# Patient Record
Sex: FEMALE | Race: ASIAN | Employment: FULL TIME | URBAN - METROPOLITAN AREA
[De-identification: names, ages, dates, MRNs, and addresses within clinical notes are randomized per-mention and may not be internally consistent; named-entity substitution may affect disease eponyms.]

---

## 2019-11-23 PROBLEM — O09.521 ADVANCED MATERNAL AGE IN MULTIGRAVIDA, FIRST TRIMESTER: Status: ACTIVE | Noted: 2019-11-23

## 2019-12-30 PROBLEM — D56.9 MATERNAL THALASSEMIA COMPLICATING PREGNANCY IN FIRST TRIMESTER: Status: ACTIVE | Noted: 2019-12-30

## 2019-12-30 PROBLEM — O34.01 BICORNUATE UTERUS AFFECTING PREGNANCY IN FIRST TRIMESTER, ANTEPARTUM: Status: ACTIVE | Noted: 2019-12-30

## 2019-12-30 PROBLEM — Q51.3 BICORNUATE UTERUS AFFECTING PREGNANCY IN FIRST TRIMESTER, ANTEPARTUM: Status: ACTIVE | Noted: 2019-12-30

## 2019-12-30 PROBLEM — Z36.82 NUCHAL TRANSLUCENCY OF FETUS ON PRENATAL ULTRASOUND: Status: ACTIVE | Noted: 2019-12-30

## 2019-12-30 PROBLEM — O99.011 MATERNAL THALASSEMIA COMPLICATING PREGNANCY IN FIRST TRIMESTER: Status: ACTIVE | Noted: 2019-12-30

## 2019-12-30 PROBLEM — O09.91 HIGH-RISK PREGNANCY, FIRST TRIMESTER: Status: ACTIVE | Noted: 2019-12-30

## 2020-01-28 ENCOUNTER — HOSPITAL ENCOUNTER (OUTPATIENT)
Dept: LAB | Age: 40
Discharge: HOME | End: 2020-01-28
Payer: COMMERCIAL

## 2020-01-28 DIAGNOSIS — O09.521 ADVANCED MATERNAL AGE IN MULTIGRAVIDA, FIRST TRIMESTER: ICD-10-CM

## 2020-01-28 DIAGNOSIS — D64.9 LOW BLOOD HEMOGLOBIN A2: ICD-10-CM

## 2020-01-28 DIAGNOSIS — Z13.0 SCREENING FOR DEFICIENCY ANEMIA: ICD-10-CM

## 2020-01-28 PROBLEM — O99.019 ANEMIA AFFECTING PREGNANCY: Status: ACTIVE | Noted: 2020-01-28

## 2020-01-28 LAB
ALBUMIN SERPL-MCNC: 3.2 G/DL (ref 3.5–5)
ALBUMIN/GLOB SERPL: 0.9 {RATIO} (ref 1.2–3.5)
ALP SERPL-CCNC: 35 U/L (ref 50–136)
ALT SERPL-CCNC: 24 U/L (ref 12–65)
ANION GAP SERPL CALC-SCNC: 6 MMOL/L (ref 7–16)
APPEARANCE UR: CLEAR
AST SERPL-CCNC: 15 U/L (ref 15–37)
BASOPHILS # BLD: 0 K/UL (ref 0–0.2)
BASOPHILS NFR BLD: 0 % (ref 0–2)
BILIRUB SERPL-MCNC: 0.4 MG/DL (ref 0.2–1.1)
BILIRUB UR QL: NEGATIVE
BUN SERPL-MCNC: 5 MG/DL (ref 6–23)
CALCIUM SERPL-MCNC: 8.8 MG/DL (ref 8.3–10.4)
CEA SERPL-MCNC: 0.8 NG/ML (ref 0–3)
CHLORIDE SERPL-SCNC: 105 MMOL/L (ref 98–107)
CO2 SERPL-SCNC: 26 MMOL/L (ref 21–32)
COLOR UR: YELLOW
CREAT SERPL-MCNC: 0.6 MG/DL (ref 0.6–1)
DIFFERENTIAL METHOD BLD: ABNORMAL
EOSINOPHIL # BLD: 0.1 K/UL (ref 0–0.8)
EOSINOPHIL NFR BLD: 1 % (ref 0.5–7.8)
ERYTHROCYTE [DISTWIDTH] IN BLOOD BY AUTOMATED COUNT: 12.7 % (ref 11.9–14.6)
ERYTHROCYTE [SEDIMENTATION RATE] IN BLOOD: 19 MM/HR (ref 0–20)
FERRITIN SERPL-MCNC: 17 NG/ML (ref 8–388)
FOLATE SERPL-MCNC: 49.1 NG/ML (ref 3.1–17.5)
GLOBULIN SER CALC-MCNC: 3.6 G/DL (ref 2.3–3.5)
GLUCOSE SERPL-MCNC: 82 MG/DL (ref 65–100)
GLUCOSE UR STRIP.AUTO-MCNC: NEGATIVE MG/DL
HCT VFR BLD AUTO: 34.7 % (ref 35.8–46.3)
HGB BLD-MCNC: 11.5 G/DL (ref 11.7–15.4)
HGB RETIC QN AUTO: 35 PG (ref 29–35)
HGB UR QL STRIP: NEGATIVE
IMM GRANULOCYTES # BLD AUTO: 0 K/UL (ref 0–0.5)
IMM GRANULOCYTES NFR BLD AUTO: 1 % (ref 0–5)
IMM RETICS NFR: 8.1 % (ref 3–15.9)
IRON SATN MFR SERPL: 29 %
IRON SERPL-MCNC: 94 UG/DL (ref 35–150)
KETONES UR QL STRIP.AUTO: NEGATIVE MG/DL
LDH SERPL L TO P-CCNC: 122 U/L (ref 100–190)
LEUKOCYTE ESTERASE UR QL STRIP.AUTO: NEGATIVE
LYMPHOCYTES # BLD: 1.8 K/UL (ref 0.5–4.6)
LYMPHOCYTES NFR BLD: 22 % (ref 13–44)
MCH RBC QN AUTO: 29.6 PG (ref 26.1–32.9)
MCHC RBC AUTO-ENTMCNC: 33.1 G/DL (ref 31.4–35)
MCV RBC AUTO: 89.2 FL (ref 79.6–97.8)
MONOCYTES # BLD: 0.4 K/UL (ref 0.1–1.3)
MONOCYTES NFR BLD: 5 % (ref 4–12)
NEUTS SEG # BLD: 5.6 K/UL (ref 1.7–8.2)
NEUTS SEG NFR BLD: 71 % (ref 43–78)
NITRITE UR QL STRIP.AUTO: NEGATIVE
NRBC # BLD: 0 K/UL (ref 0–0.2)
PH UR STRIP: 7 [PH] (ref 5–9)
PHOSPHATE SERPL-MCNC: 3.1 MG/DL (ref 2.5–4.5)
PLATELET # BLD AUTO: 165 K/UL (ref 150–450)
PMV BLD AUTO: 9.9 FL (ref 9.4–12.3)
POTASSIUM SERPL-SCNC: 3.7 MMOL/L (ref 3.5–5.1)
PROT SERPL-MCNC: 6.8 G/DL (ref 6.3–8.2)
PROT UR STRIP-MCNC: NEGATIVE MG/DL
RBC # BLD AUTO: 3.89 M/UL (ref 4.05–5.25)
RETICS # AUTO: 0.07 M/UL (ref 0.03–0.1)
RETICS/RBC NFR AUTO: 1.7 % (ref 0.3–2)
SODIUM SERPL-SCNC: 137 MMOL/L (ref 136–145)
SP GR UR REFRACTOMETRY: 1.01 (ref 1–1.02)
T4 FREE SERPL-MCNC: 1.3 NG/DL (ref 0.78–1.46)
TIBC SERPL-MCNC: 326 UG/DL (ref 250–450)
TSH SERPL DL<=0.005 MIU/L-ACNC: 1.36 UIU/ML (ref 0.36–3.74)
URATE SERPL-MCNC: 3.2 MG/DL (ref 2.6–6)
UROBILINOGEN UR QL STRIP.AUTO: 0.2 EU/DL (ref 0.2–1)
VIT B12 SERPL-MCNC: 457 PG/ML (ref 193–986)
WBC # BLD AUTO: 7.9 K/UL (ref 4.3–11.1)

## 2020-01-28 PROCEDURE — 80053 COMPREHEN METABOLIC PANEL: CPT

## 2020-01-28 PROCEDURE — 82728 ASSAY OF FERRITIN: CPT

## 2020-01-28 PROCEDURE — 84100 ASSAY OF PHOSPHORUS: CPT

## 2020-01-28 PROCEDURE — 85652 RBC SED RATE AUTOMATED: CPT

## 2020-01-28 PROCEDURE — 85046 RETICYTE/HGB CONCENTRATE: CPT

## 2020-01-28 PROCEDURE — 84238 ASSAY NONENDOCRINE RECEPTOR: CPT

## 2020-01-28 PROCEDURE — 85025 COMPLETE CBC W/AUTO DIFF WBC: CPT

## 2020-01-28 PROCEDURE — 36415 COLL VENOUS BLD VENIPUNCTURE: CPT

## 2020-01-28 PROCEDURE — 83021 HEMOGLOBIN CHROMOTOGRAPHY: CPT

## 2020-01-28 PROCEDURE — 83615 LACTATE (LD) (LDH) ENZYME: CPT

## 2020-01-28 PROCEDURE — 82607 VITAMIN B-12: CPT

## 2020-01-28 PROCEDURE — 83540 ASSAY OF IRON: CPT

## 2020-01-28 PROCEDURE — 84439 ASSAY OF FREE THYROXINE: CPT

## 2020-01-28 PROCEDURE — 86038 ANTINUCLEAR ANTIBODIES: CPT

## 2020-01-28 PROCEDURE — 81003 URINALYSIS AUTO W/O SCOPE: CPT

## 2020-01-28 PROCEDURE — 82378 CARCINOEMBRYONIC ANTIGEN: CPT

## 2020-01-28 PROCEDURE — 84443 ASSAY THYROID STIM HORMONE: CPT

## 2020-01-28 PROCEDURE — 84550 ASSAY OF BLOOD/URIC ACID: CPT

## 2020-01-28 PROCEDURE — 82747 ASSAY OF FOLIC ACID RBC: CPT

## 2020-01-28 PROCEDURE — 82746 ASSAY OF FOLIC ACID SERUM: CPT

## 2020-01-29 LAB
ANA SER QL: NEGATIVE
DEPRECATED HGB OTHER BLD-IMP: 0 %
FOLATE BLD-MCNC: 468 NG/ML
FOLATE RBC-MCNC: 1322 NG/ML
HCT VFR BLD AUTO: 35.4 % (ref 34–46.6)
HGB A MFR BLD: 98 % (ref 96.4–98.8)
HGB A2 MFR BLD COLUMN CHROM: 2 % (ref 1.8–3.2)
HGB C MFR BLD: 0 %
HGB F MFR BLD: 0 % (ref 0–2)
HGB FRACT BLD-IMP: NORMAL
HGB S BLD QL SOLY: NEGATIVE
HGB S MFR BLD: 0 %
TRANSFERRIN SERPL-SCNC: 14.4 NMOL/L (ref 12.2–27.3)

## 2020-01-30 LAB — PERIPHERAL SMEAR,PSM: NORMAL

## 2020-02-06 PROBLEM — O09.92 HIGH-RISK PREGNANCY IN SECOND TRIMESTER: Status: ACTIVE | Noted: 2019-12-30

## 2020-02-06 PROBLEM — Z36.82 NUCHAL TRANSLUCENCY OF FETUS ON PRENATAL ULTRASOUND: Status: RESOLVED | Noted: 2019-12-30 | Resolved: 2020-02-06

## 2020-02-06 PROBLEM — O09.522 MULTIGRAVIDA OF ADVANCED MATERNAL AGE IN SECOND TRIMESTER: Status: ACTIVE | Noted: 2019-11-23

## 2020-02-06 PROBLEM — O34.02 UTERUS BICORNIS AFFECTING PREGNANCY IN SECOND TRIMESTER: Status: ACTIVE | Noted: 2019-12-30

## 2020-02-11 PROBLEM — O44.42 LOW LYING PLACENTA NOS OR WITHOUT HEMORRHAGE, SECOND TRIMESTER: Status: ACTIVE | Noted: 2020-02-11

## 2020-06-24 NOTE — PROGRESS NOTES
Patient ID verified. Allergies, medical history, prenatal record and prior to admission medications verified. Pt instructed to be NPO after midnight. Pt instructed  to call the hospital at [address] [place] [address] Road come to entrance C and sign in at the registration desk on the 4th floor. Patient instructed to come to hospital sooner if SROM, labor, or concerning symptoms. Patient verbalized understanding. Questions encouraged and answered.

## 2020-06-25 ENCOUNTER — ANESTHESIA (OUTPATIENT)
Dept: LABOR AND DELIVERY | Age: 40
End: 2020-06-25
Payer: COMMERCIAL

## 2020-06-25 ENCOUNTER — ANESTHESIA EVENT (OUTPATIENT)
Dept: LABOR AND DELIVERY | Age: 40
End: 2020-06-25
Payer: COMMERCIAL

## 2020-06-25 ENCOUNTER — HOSPITAL ENCOUNTER (INPATIENT)
Age: 40
LOS: 2 days | Discharge: HOME | End: 2020-06-27
Attending: OBSTETRICS & GYNECOLOGY | Admitting: OBSTETRICS & GYNECOLOGY
Payer: COMMERCIAL

## 2020-06-25 PROBLEM — O41.00X0 OLIGOHYDRAMNIOS: Status: ACTIVE | Noted: 2020-06-25

## 2020-06-25 PROBLEM — O09.519 ADVANCED MATERNAL AGE, 1ST PREGNANCY: Status: ACTIVE | Noted: 2020-06-25

## 2020-06-25 PROBLEM — Z34.90 ENCOUNTER FOR INDUCTION OF LABOR: Status: ACTIVE | Noted: 2020-06-25

## 2020-06-25 LAB
ABO + RH BLD: NORMAL
ALBUMIN SERPL-MCNC: 2.7 G/DL (ref 3.5–5)
ALBUMIN/GLOB SERPL: 0.7 {RATIO} (ref 1.2–3.5)
ALP SERPL-CCNC: 215 U/L (ref 50–136)
ALT SERPL-CCNC: 19 U/L (ref 12–65)
ANION GAP SERPL CALC-SCNC: 7 MMOL/L (ref 7–16)
AST SERPL-CCNC: 23 U/L (ref 15–37)
BASOPHILS # BLD: 0 K/UL (ref 0–0.2)
BASOPHILS NFR BLD: 0 % (ref 0–2)
BILIRUB SERPL-MCNC: 0.2 MG/DL (ref 0.2–1.1)
BLOOD GROUP ANTIBODIES SERPL: NORMAL
BUN SERPL-MCNC: 9 MG/DL (ref 6–23)
CALCIUM SERPL-MCNC: 8.8 MG/DL (ref 8.3–10.4)
CHLORIDE SERPL-SCNC: 106 MMOL/L (ref 98–107)
CO2 SERPL-SCNC: 23 MMOL/L (ref 21–32)
CREAT SERPL-MCNC: 0.8 MG/DL (ref 0.6–1)
DIFFERENTIAL METHOD BLD: NORMAL
EOSINOPHIL # BLD: 0.1 K/UL (ref 0–0.8)
EOSINOPHIL NFR BLD: 1 % (ref 0.5–7.8)
ERYTHROCYTE [DISTWIDTH] IN BLOOD BY AUTOMATED COUNT: 13.1 % (ref 11.9–14.6)
GLOBULIN SER CALC-MCNC: 4.1 G/DL (ref 2.3–3.5)
GLUCOSE SERPL-MCNC: 107 MG/DL (ref 65–100)
HCT VFR BLD AUTO: 37.6 % (ref 35.8–46.3)
HGB BLD-MCNC: 12.4 G/DL (ref 11.7–15.4)
IMM GRANULOCYTES # BLD AUTO: 0 K/UL (ref 0–0.5)
IMM GRANULOCYTES NFR BLD AUTO: 1 % (ref 0–5)
LYMPHOCYTES # BLD: 1.7 K/UL (ref 0.5–4.6)
LYMPHOCYTES NFR BLD: 21 % (ref 13–44)
MCH RBC QN AUTO: 30.6 PG (ref 26.1–32.9)
MCHC RBC AUTO-ENTMCNC: 33 G/DL (ref 31.4–35)
MCV RBC AUTO: 92.8 FL (ref 79.6–97.8)
MONOCYTES # BLD: 0.5 K/UL (ref 0.1–1.3)
MONOCYTES NFR BLD: 6 % (ref 4–12)
NEUTS SEG # BLD: 6 K/UL (ref 1.7–8.2)
NEUTS SEG NFR BLD: 72 % (ref 43–78)
NRBC # BLD: 0 K/UL (ref 0–0.2)
PLATELET # BLD AUTO: 190 K/UL (ref 150–450)
PMV BLD AUTO: 10.7 FL (ref 9.4–12.3)
POTASSIUM SERPL-SCNC: 3.4 MMOL/L (ref 3.5–5.1)
PROT SERPL-MCNC: 6.8 G/DL (ref 6.3–8.2)
RBC # BLD AUTO: 4.05 M/UL (ref 4.05–5.2)
SODIUM SERPL-SCNC: 136 MMOL/L (ref 136–145)
SPECIMEN EXP DATE BLD: NORMAL
WBC # BLD AUTO: 8.3 K/UL (ref 4.3–11.1)

## 2020-06-25 PROCEDURE — 77030011945 HC CATH URIN INT ST MENT -A

## 2020-06-25 PROCEDURE — 74011250637 HC RX REV CODE- 250/637: Performed by: OBSTETRICS & GYNECOLOGY

## 2020-06-25 PROCEDURE — 75410000000 HC DELIVERY VAGINAL/SINGLE

## 2020-06-25 PROCEDURE — A4300 CATH IMPL VASC ACCESS PORTAL: HCPCS | Performed by: ANESTHESIOLOGY

## 2020-06-25 PROCEDURE — 75410000002 HC LABOR FEE PER 1 HR

## 2020-06-25 PROCEDURE — 65270000029 HC RM PRIVATE

## 2020-06-25 PROCEDURE — 86900 BLOOD TYPING SEROLOGIC ABO: CPT

## 2020-06-25 PROCEDURE — 3E0P7VZ INTRODUCTION OF HORMONE INTO FEMALE REPRODUCTIVE, VIA NATURAL OR ARTIFICIAL OPENING: ICD-10-PCS | Performed by: OBSTETRICS & GYNECOLOGY

## 2020-06-25 PROCEDURE — 00HU33Z INSERTION OF INFUSION DEVICE INTO SPINAL CANAL, PERCUTANEOUS APPROACH: ICD-10-PCS | Performed by: ANESTHESIOLOGY

## 2020-06-25 PROCEDURE — 75410000003 HC RECOV DEL/VAG/CSECN EA 0.5 HR

## 2020-06-25 PROCEDURE — 85025 COMPLETE CBC W/AUTO DIFF WBC: CPT

## 2020-06-25 PROCEDURE — 80053 COMPREHEN METABOLIC PANEL: CPT

## 2020-06-25 PROCEDURE — 74011000250 HC RX REV CODE- 250: Performed by: ANESTHESIOLOGY

## 2020-06-25 PROCEDURE — 77010026065 HC OXYGEN MINIMUM MEDICAL AIR

## 2020-06-25 PROCEDURE — 0HQ9XZZ REPAIR PERINEUM SKIN, EXTERNAL APPROACH: ICD-10-PCS | Performed by: OBSTETRICS & GYNECOLOGY

## 2020-06-25 PROCEDURE — 74011250636 HC RX REV CODE- 250/636: Performed by: NURSE ANESTHETIST, CERTIFIED REGISTERED

## 2020-06-25 PROCEDURE — 74011250636 HC RX REV CODE- 250/636: Performed by: OBSTETRICS & GYNECOLOGY

## 2020-06-25 PROCEDURE — 3E033VJ INTRODUCTION OF OTHER HORMONE INTO PERIPHERAL VEIN, PERCUTANEOUS APPROACH: ICD-10-PCS | Performed by: OBSTETRICS & GYNECOLOGY

## 2020-06-25 PROCEDURE — 77030002933 HC SUT MCRYL J&J -A

## 2020-06-25 PROCEDURE — 76060000078 HC EPIDURAL ANESTHESIA

## 2020-06-25 PROCEDURE — 74011250636 HC RX REV CODE- 250/636

## 2020-06-25 PROCEDURE — 77030018846 HC SOL IRR STRL H20 ICUM -A

## 2020-06-25 PROCEDURE — 77030014125 HC TY EPDRL BBMI -B: Performed by: ANESTHESIOLOGY

## 2020-06-25 RX ORDER — ZOLPIDEM TARTRATE 5 MG/1
5 TABLET ORAL
Status: DISCONTINUED | OUTPATIENT
Start: 2020-06-25 | End: 2020-06-27 | Stop reason: HOSPADM

## 2020-06-25 RX ORDER — IBUPROFEN 800 MG/1
800 TABLET ORAL
Status: DISCONTINUED | OUTPATIENT
Start: 2020-06-25 | End: 2020-06-27 | Stop reason: HOSPADM

## 2020-06-25 RX ORDER — OXYCODONE HYDROCHLORIDE 5 MG/1
10 TABLET ORAL
Status: DISCONTINUED | OUTPATIENT
Start: 2020-06-25 | End: 2020-06-27 | Stop reason: HOSPADM

## 2020-06-25 RX ORDER — MINERAL OIL
120 OIL (ML) ORAL
Status: COMPLETED | OUTPATIENT
Start: 2020-06-25 | End: 2020-06-25

## 2020-06-25 RX ORDER — HYDROCORTISONE ACETATE PRAMOXINE HCL 2.5; 1 G/100G; G/100G
CREAM TOPICAL
Status: DISCONTINUED | OUTPATIENT
Start: 2020-06-25 | End: 2020-06-27 | Stop reason: HOSPADM

## 2020-06-25 RX ORDER — DEXTROSE, SODIUM CHLORIDE, SODIUM LACTATE, POTASSIUM CHLORIDE, AND CALCIUM CHLORIDE 5; .6; .31; .03; .02 G/100ML; G/100ML; G/100ML; G/100ML; G/100ML
125 INJECTION, SOLUTION INTRAVENOUS CONTINUOUS
Status: DISCONTINUED | OUTPATIENT
Start: 2020-06-25 | End: 2020-06-25

## 2020-06-25 RX ORDER — SIMETHICONE 80 MG
80 TABLET,CHEWABLE ORAL
Status: DISCONTINUED | OUTPATIENT
Start: 2020-06-25 | End: 2020-06-27 | Stop reason: HOSPADM

## 2020-06-25 RX ORDER — BUTORPHANOL TARTRATE 2 MG/ML
1 INJECTION INTRAMUSCULAR; INTRAVENOUS
Status: DISCONTINUED | OUTPATIENT
Start: 2020-06-25 | End: 2020-06-25 | Stop reason: HOSPADM

## 2020-06-25 RX ORDER — METHYLERGONOVINE MALEATE 0.2 MG/ML
INJECTION INTRAVENOUS
Status: COMPLETED
Start: 2020-06-25 | End: 2020-06-25

## 2020-06-25 RX ORDER — DOCUSATE SODIUM 100 MG/1
100 CAPSULE, LIQUID FILLED ORAL
Status: DISCONTINUED | OUTPATIENT
Start: 2020-06-25 | End: 2020-06-27 | Stop reason: HOSPADM

## 2020-06-25 RX ORDER — SODIUM CHLORIDE 0.9 % (FLUSH) 0.9 %
5-40 SYRINGE (ML) INJECTION EVERY 8 HOURS
Status: DISCONTINUED | OUTPATIENT
Start: 2020-06-25 | End: 2020-06-25

## 2020-06-25 RX ORDER — NALOXONE HYDROCHLORIDE 0.4 MG/ML
0.4 INJECTION, SOLUTION INTRAMUSCULAR; INTRAVENOUS; SUBCUTANEOUS AS NEEDED
Status: DISCONTINUED | OUTPATIENT
Start: 2020-06-25 | End: 2020-06-27 | Stop reason: HOSPADM

## 2020-06-25 RX ORDER — OXYCODONE HYDROCHLORIDE 5 MG/1
5 TABLET ORAL
Status: DISCONTINUED | OUTPATIENT
Start: 2020-06-25 | End: 2020-06-27 | Stop reason: HOSPADM

## 2020-06-25 RX ORDER — ACETAMINOPHEN 500 MG
1000 TABLET ORAL
Status: DISCONTINUED | OUTPATIENT
Start: 2020-06-25 | End: 2020-06-27 | Stop reason: HOSPADM

## 2020-06-25 RX ORDER — ROPIVACAINE HYDROCHLORIDE 2 MG/ML
INJECTION, SOLUTION EPIDURAL; INFILTRATION; PERINEURAL
Status: DISCONTINUED | OUTPATIENT
Start: 2020-06-25 | End: 2020-06-25 | Stop reason: HOSPADM

## 2020-06-25 RX ORDER — LIDOCAINE HYDROCHLORIDE AND EPINEPHRINE 15; 5 MG/ML; UG/ML
INJECTION, SOLUTION EPIDURAL
Status: COMPLETED | OUTPATIENT
Start: 2020-06-25 | End: 2020-06-25

## 2020-06-25 RX ORDER — LIDOCAINE HYDROCHLORIDE 10 MG/ML
1 INJECTION INFILTRATION; PERINEURAL
Status: DISCONTINUED | OUTPATIENT
Start: 2020-06-25 | End: 2020-06-25 | Stop reason: HOSPADM

## 2020-06-25 RX ORDER — OXYTOCIN/RINGER'S LACTATE 30/500 ML
0-25 PLASTIC BAG, INJECTION (ML) INTRAVENOUS
Status: DISCONTINUED | OUTPATIENT
Start: 2020-06-25 | End: 2020-06-25

## 2020-06-25 RX ORDER — LIDOCAINE HYDROCHLORIDE 20 MG/ML
JELLY TOPICAL
Status: DISCONTINUED | OUTPATIENT
Start: 2020-06-25 | End: 2020-06-25 | Stop reason: HOSPADM

## 2020-06-25 RX ORDER — ROPIVACAINE HYDROCHLORIDE 2 MG/ML
INJECTION, SOLUTION EPIDURAL; INFILTRATION; PERINEURAL AS NEEDED
Status: DISCONTINUED | OUTPATIENT
Start: 2020-06-25 | End: 2020-06-25 | Stop reason: HOSPADM

## 2020-06-25 RX ORDER — SODIUM CHLORIDE 0.9 % (FLUSH) 0.9 %
5-40 SYRINGE (ML) INJECTION AS NEEDED
Status: DISCONTINUED | OUTPATIENT
Start: 2020-06-25 | End: 2020-06-25

## 2020-06-25 RX ORDER — OXYTOCIN/RINGER'S LACTATE 30/500 ML
250 PLASTIC BAG, INJECTION (ML) INTRAVENOUS ONCE
Status: ACTIVE | OUTPATIENT
Start: 2020-06-25 | End: 2020-06-25

## 2020-06-25 RX ORDER — LOPERAMIDE HYDROCHLORIDE 2 MG/1
2 CAPSULE ORAL
Status: DISCONTINUED | OUTPATIENT
Start: 2020-06-25 | End: 2020-06-27 | Stop reason: HOSPADM

## 2020-06-25 RX ORDER — ONDANSETRON 4 MG/1
4 TABLET, ORALLY DISINTEGRATING ORAL
Status: ACTIVE | OUTPATIENT
Start: 2020-06-25 | End: 2020-06-26

## 2020-06-25 RX ORDER — DIPHENHYDRAMINE HCL 25 MG
25 CAPSULE ORAL
Status: DISCONTINUED | OUTPATIENT
Start: 2020-06-25 | End: 2020-06-27 | Stop reason: HOSPADM

## 2020-06-25 RX ADMIN — Medication 2 MILLI-UNITS/MIN: at 09:55

## 2020-06-25 RX ADMIN — LIDOCAINE HYDROCHLORIDE,EPINEPHRINE BITARTRATE 3 ML: 15; .005 INJECTION, SOLUTION EPIDURAL; INFILTRATION; INTRACAUDAL; PERINEURAL at 09:43

## 2020-06-25 RX ADMIN — Medication 4 ML: at 09:50

## 2020-06-25 RX ADMIN — MISOPROSTOL 25 MCG: 100 TABLET ORAL at 05:58

## 2020-06-25 RX ADMIN — METHYLERGONOVINE MALEATE 0.2 MG: 0.2 INJECTION, SOLUTION INTRAMUSCULAR; INTRAVENOUS at 15:50

## 2020-06-25 RX ADMIN — WITCH HAZEL 1 PAD: 500 SOLUTION RECTAL; TOPICAL at 18:27

## 2020-06-25 RX ADMIN — BENZOCAINE AND LEVOMENTHOL 1 SPRAY: 200; 5 SPRAY TOPICAL at 18:27

## 2020-06-25 RX ADMIN — SODIUM CHLORIDE, SODIUM LACTATE, POTASSIUM CHLORIDE, CALCIUM CHLORIDE, AND DEXTROSE MONOHYDRATE 125 ML/HR: 600; 310; 30; 20; 5 INJECTION, SOLUTION INTRAVENOUS at 09:00

## 2020-06-25 RX ADMIN — MINERAL OIL 120 ML: 471.95 OIL ORAL at 15:00

## 2020-06-25 RX ADMIN — MISOPROSTOL 25 MCG: 100 TABLET ORAL at 01:25

## 2020-06-25 RX ADMIN — IBUPROFEN 800 MG: 800 TABLET ORAL at 18:27

## 2020-06-25 RX ADMIN — ROPIVACAINE HYDROCHLORIDE 8 ML/HR: 2 INJECTION, SOLUTION EPIDURAL; INFILTRATION at 09:51

## 2020-06-25 RX ADMIN — Medication 3 ML: at 09:47

## 2020-06-25 RX ADMIN — SODIUM CHLORIDE, SODIUM LACTATE, POTASSIUM CHLORIDE, AND CALCIUM CHLORIDE 1000 ML: 600; 310; 30; 20 INJECTION, SOLUTION INTRAVENOUS at 11:06

## 2020-06-25 NOTE — PROGRESS NOTES
06/25/20 1215   Patient Observation   Repositioned Birthing ball  (Peanut ball)   Membranes   Amniotic Fluid Description Clear;Blood stained   Amniotic Fluid Volume  Scant   Cervical Exam   Dilation (cm) 4.5   Eff 90 %   Station -2   Position Anterior   Cervical Consistency Soft   Vaginal exam done by?  PATI RN   Bladder emptied and jane care done. Repositioned to right side.

## 2020-06-25 NOTE — PROGRESS NOTES
CTSP for fetal tachycardia. Baseline 170s with occasional nonrepetitive decels. Overall, Category 2 tracing. Discussed concern for chorio, however, pt did not have an elevated white count and she has no fundal tenderness. This fetal tachycardia did not develop until pt was given neosynephrine for low BP after ephedrine did not help maintain her BP after her epidural. Perhaps this is more the cause, however, we discussed my concern regarding fetal exhaustion with maintaining such a high heart rate for an extended amount of time. Given that she has changed from 6cm to 8 cm in less than an hour, discussed risks of proceeding with section at this time versus risks of continuing for vaginal delivery. Pt desires to try for vaginal delivery at this time. Discussed that we will monitor her very closely and  may be warranted if she is not continuing to dilate as quickly as she did from 6cm to 8cm. She and her  expressed understanding.

## 2020-06-25 NOTE — PROGRESS NOTES
SBAR IN Report: Mother    Verbal report received from TAE Granados RN (full name & credentials) on this patient, who is now being transferred from [facility] (unit) for routine progression of care. The patient is not wearing a green \"Anesthesia-Duramorph\" band. Report consisted of patient's Situation, Background, Assessment and Recommendations (SBAR).  ID bands were compared with the identification form, and verified with the patient and transferring nurse. Information from the Procedure Summary and the Denisha Report was reviewed with the transferring nurse; opportunity for questions and clarification provided.

## 2020-06-25 NOTE — PROGRESS NOTES
Pt admitted to room 433. EFM on and tracing. IV started, labs collected and sent. Consents signed. POC reviewed. Bed low and locked, call light within reach.  at bedside. No needs/concerns at this time.   Will call out PRN

## 2020-06-25 NOTE — H&P
History & Physical    Name: Gurpreet White MRN: [id number]  SSN: [id number]    YOB: 1980  Age: 47 y.o. Sex: female      Subjective:     Estimated Date of Delivery: 20  OB History    Para Term  AB Living   1             SAB TAB Ectopic Molar Multiple Live Births                    # Outcome Date GA Lbr Kolby/2nd Weight Sex Delivery Anes PTL Lv   1 Current                Ms. Daniel Gave is admitted with pregnancy at 38w4d for induction of labor due to oligohydramnios. Prenatal course was complicated by advanced maternal age and bicornuate uterus. Please see prenatal records for details. History reviewed. No pertinent past medical history. Past Surgical History:   Procedure Laterality Date    HX BREAST LUMPECTOMY Right      Social History     Occupational History    Not on file   Tobacco Use    Smoking status: Never Smoker    Smokeless tobacco: Never Used   Substance and Sexual Activity    Alcohol use: Not Currently    Drug use: Never    Sexual activity: Yes     Partners: Male     Birth control/protection: None     Family History   Problem Relation Age of Onset    Breast Cancer Mother     Colon Cancer Neg Hx     Ovarian Cancer Neg Hx     Diabetes Neg Hx     Hypertension Neg Hx        No Known Allergies  Prior to Admission medications    Medication Sig Start Date End Date Taking? Authorizing Provider   calcium carbonate (TUMS PO) Take  by mouth. Yes Provider, Historical   [id number] erasto-folic-dha (PRENATAL DHA+COMPLETE PRENATAL) [id number] mg-mcg-mg cmpk Take  by mouth. Yes Provider, Historical   AMBULATORY BREAST PUMP Use as directed 20   Jaleel Mccann MD   acetaminophen (TYLENOL) 325 mg tablet Take  by mouth every four (4) hours as needed for Pain. Provider, Historical   mag/aluminum/sod bicarb/alginc (GAVISCON PO) Take  by mouth.     Provider, Historical        Review of Systems: A comprehensive review of systems was negative except for that written in the History of Present Illness. Objective:     Vitals:  Vitals:    06/25/20 0052 06/25/20 0559   BP: 114/63 107/58   Pulse: 96 83   Resp:  16   Temp:  98 °F (36.7 °C)        Physical Exam:  Patient without distress. Heart: Regular rate   Lung: normal respiratory effort  Abdomen: soft, nontender, gravid  Fundus: soft and non tender  Perineum: small amount of bloody show noted, amniotic fluid absent  Cervical Exam: pt unable to tolerate SVE   Lower Extremities:  - Edema No  Membranes:  Intact  Fetal Heart Rate: Reactive, Category 1     Prenatal Labs:   Lab Results   Component Value Date/Time    ABO/Rh(D) B POSITIVE 06/25/2020 12:49 AM       Impression/Plan:     Active Problems:    Uterus bicornis affecting pregnancy in third trimester (12/30/2019)      Overview: 12/30/2019 at [facility]-Bicornuate Uterus noted today, at increased risk of       IUGR and Cervical insufficiency after 20 weeks. See AMA Overview      Oligohydramnios (6/25/2020)      Advanced maternal age, 1st pregnancy (6/25/2020)      Encounter for induction of labor (6/25/2020)         Plan: Admit for induction of labor. Group B Strep negative. Risks, benefits and alternatives explained in detail yesterday in the office and reviewed today - pt desires to proceed with IOL as planned. Pt is unable to tolerate SVE, having bloody show and is visibly tensing with contractions. She is open to going ahead with epidural thus she will have epidural and then I will return for cervical exam and possible AROM. Continue IV pitocin, titrate per protocol.

## 2020-06-25 NOTE — PROGRESS NOTES
06/25/20 1337   Cervical Exam   Dilation (cm) 8   Eff 100 %   Station -2   Vaginal exam done by?  [facility] RN   Dr Maicol Mantilla at bedside. Discussing POC.

## 2020-06-25 NOTE — PROGRESS NOTES
06/25/20 1005 06/25/20 1006 06/25/20 1008   Maternal Vital Signs   Pulse (Heart Rate) 94 92 92   BP (!) 78/41 (!) 76/37 (!) 79/40   MAP (Monitor) (!) 57 (!) 51 (!) 54   MAP (Calculated) (!) 53 (!) 50 (!) 53   CRNA at bedside to give ephedrine

## 2020-06-25 NOTE — PROGRESS NOTES
06/25/20 1419   Cervical Exam   Dilation (cm) 10   Eff 100 %   Station -1   Vaginal exam done by?  PATI RN   Dr David Rogel notified and pushing

## 2020-06-25 NOTE — PROGRESS NOTES
Safety Teaching reviewed:   1. Hand hygiene prior to handling the infant. 2. Use of bulb syringe  3. Bracelets with matching numbers are placed on mother and infant  3. An infant security tag  [facility]) is placed on the infant's ankle and monitored  5. All OB nurses wear pink Employee badges - do not give your baby to anyone without proper identification. 6. Never leave the baby alone in the room. 7. The infant should be placed on their back to sleep. on a firm mattress. No toys should be placed in the crib. (safe sleep video offered to view)  8. Never shake the baby (video offered to view)  9. Infant fall prevention - do not sleep with the baby, and place the baby in the crib while ambulating. 8. Mother and Baby Care booklet given to Mother.

## 2020-06-25 NOTE — PROGRESS NOTES
When PCTs were taking patient to restroom, they called out for assistance. Patient states she was having chest pain and could not breath. Patient assisted back to bed. Vital signs taken. Patient was able to void 500 ml. When patient got back to bed she stated she had a history of anxiety, felt so much better when in bed and could breath. Patient reassured.

## 2020-06-25 NOTE — ANESTHESIA PROCEDURE NOTES
Epidural Block    Start time: 6/25/2020 9:38 AM  End time: 6/25/2020 9:43 AM  Performed by: Mayda Jarrell MD  Authorized by:  Mayda Jarrell MD     Pre-Procedure  Indication: labor epidural    Preanesthetic Checklist: patient identified, risks and benefits discussed, anesthesia consent, site marked, patient being monitored, timeout performed and anesthesia consent    Timeout Time: 09:35        Epidural:   Patient position:  Seated  Prep region:  Lumbar  Prep: Patient draped    Location:  L2-3    Needle and Epidural Catheter:   Needle Type:  Tuohy  Needle Gauge:  17 G  Injection Technique:  Loss of resistance using air  Attempts:  1  Catheter Size:  19 G  Catheter at Skin Depth (cm):  10  Depth in Epidural Space (cm):  6  Events: no blood with aspiration, no cerebrospinal fluid with aspiration, no paresthesia and negative aspiration test    Test Dose:  Negative    Assessment:   Catheter Secured:  Tegaderm and tape  Insertion:  Uncomplicated  Patient tolerance:  Patient tolerated the procedure well with no immediate complications

## 2020-06-25 NOTE — PROGRESS NOTES
This note also relates to the following rows which could not be included:  Action - Cannot attach notes to extension rows  Pitocin Dose (darin-units/min) - Cannot attach notes to extension rows       06/25/20 1254   Fetal Vital Signs   FHR Interventions Decrease Oxytocin

## 2020-06-25 NOTE — PROGRESS NOTES
06/25/20 1055   Membranes   Membrane Status AROM   Sac Identifier Sac 1   Rupture Date 06/25/20   Rupture Time 1056   Amniotic Fluid Description Blood stained   Amniotic Fluid Volume  Scant   Cervical Exam   Dilation (cm) 3   Eff 70 %   Station -2   Vaginal exam done byGia Stover

## 2020-06-25 NOTE — ANESTHESIA PREPROCEDURE EVALUATION
Relevant Problems   No relevant active problems       Anesthetic History   No history of anesthetic complications            Review of Systems / Medical History  Patient summary reviewed and pertinent labs reviewed    Pulmonary  Within defined limits                 Neuro/Psych   Within defined limits           Cardiovascular                  Exercise tolerance: >4 METS     GI/Hepatic/Renal  Within defined limits              Endo/Other  Within defined limits           Other Findings   Comments: Induction for oligo, G1           Physical Exam    Airway  Mallampati: II  TM Distance: 4 - 6 cm  Neck ROM: normal range of motion   Mouth opening: Normal     Cardiovascular    Rhythm: regular  Rate: normal         Dental  No notable dental hx       Pulmonary  Breath sounds clear to auscultation               Abdominal         Other Findings            Anesthetic Plan    ASA: 2  Anesthesia type: epidural            Anesthetic plan and risks discussed with: Patient and Spouse

## 2020-06-25 NOTE — PROGRESS NOTES
SBAR OUT Report: Mother    Verbal report given to Medina Tapia RN on this patient, who is now being transferred to [facility] for routine progression of care. The patient is not wearing a green \"Anesthesia-Duramorph\" band. Report consisted of patient's Situation, Background, Assessment and Recommendations (SBAR). Norris ID bands were compared with the identification form, and verified with the patient and receiving nurse. Information from the SBAR, Kardex, Procedure Summary, Intake/Output, MAR and Recent Results and the [place] Report was reviewed with the receiving nurse; opportunity for questions and clarification provided.

## 2020-06-25 NOTE — PROGRESS NOTES
In to pt room, pt complains of pain 6/10. Pt denies need for pain medication at this time.  Pt will call out when ready for RN to check cervix and possibly proceed with epidural.

## 2020-06-26 PROCEDURE — 65270000029 HC RM PRIVATE

## 2020-06-26 PROCEDURE — 74011250637 HC RX REV CODE- 250/637: Performed by: OBSTETRICS & GYNECOLOGY

## 2020-06-26 RX ADMIN — IBUPROFEN 800 MG: 800 TABLET ORAL at 13:30

## 2020-06-26 RX ADMIN — IBUPROFEN 800 MG: 800 TABLET ORAL at 20:47

## 2020-06-26 RX ADMIN — IBUPROFEN 800 MG: 800 TABLET ORAL at 06:04

## 2020-06-26 RX ADMIN — DOCUSATE SODIUM 100 MG: 100 CAPSULE, LIQUID FILLED ORAL at 20:48

## 2020-06-26 RX ADMIN — DOCUSATE SODIUM 100 MG: 100 CAPSULE, LIQUID FILLED ORAL at 08:48

## 2020-06-26 NOTE — PROGRESS NOTES
SW met with pt who reports she has all infant care needs. Pt and FOB were educated about [address] and agreeable. [facility] referral started and will be faxed at [place]. Pt given PCP list and encouraged to get established for routine well visits. Pt given PPD packet and informed she can contact Valeria Ortiz with any needs, questions or concerns after dc. No additional needs or questions identified at the time of this assessment.   Hilarie Rubinstein ,BSW

## 2020-06-26 NOTE — LACTATION NOTE

## 2020-06-26 NOTE — LACTATION NOTE
This note was copied from a baby's chart. In to see mom and infant for follow up and do feeding observation. Answered parents many breast feeding questions. Reviewed 1st and 2nd 24 hr feeding/output expectations. Reviewed AAP recommendations on pacifiers and bottles, pumping and storage ?'s. Showed mom football hold on right breast. Baby latched after a few attempts. Observed baby feed for 15 minutes. Reviewed signs of good latch and alignment,  Nutritive vs non nutritive sucking. Burped infant and mom latched baby to other breast. Reviewed lip flange. Baby continues to feed. Will follow up in am for discharge.

## 2020-06-26 NOTE — PROGRESS NOTES
Post-Partum Day Number 1 Progress Note    Patient doing well post-partum day #1 without significant complaint. Voiding without difficulty, normal lochia. Patient Vitals for the past 24 hrs:   Temp Pulse Resp BP SpO2   06/26/20 0747 98.7 °F (37.1 °C) 90 18 92/54 97 %   06/25/20 2000 98.5 °F (36.9 °C) 100 18 92/47 97 %   06/25/20 1921  84 18  98 %   06/25/20 1822  76 18 107/58    06/25/20 1724 97.9 °F (36.6 °C) 81 16 105/61    06/25/20 1629  75  95/53    06/25/20 1614  87 20 107/68    06/25/20 1608 98 °F (36.7 °C)  18     06/25/20 1559  96  116/62    06/25/20 1529  (!) 110 20 113/53    06/25/20 1527  90  103/52    06/25/20 1514  (!) 130  156/82    06/25/20 1458  98  105/55    06/25/20 1444  92  115/58    06/25/20 1431  93 20 (!) 122/102    06/25/20 1413  83  109/65    06/25/20 1358  84  103/61    06/25/20 1345  86  103/63        Exam:  Patient without distress. Abdomen soft, fundus firm at level of umbilicus, nontender               Perineum with normal lochia noted. Lower extremities are negative for swelling, cords or tenderness.         Recent Labs     06/25/20  0049 04/20/20  0913 01/28/20  1010 11/18/19  1115   WBC 8.3 9.6 7.9 7.5   HGB 12.4 12.2 11.5* 12.3   HCT 37.6 36.0 35.4  34.7* 37.1    175 165 218       Recent Labs     06/25/20  0049 01/28/20  1010    137   K 3.4* 3.7    105   CO2 23 26   AGAP 7 6*   * 82   BUN 9 5*   CREA 0.80 0.60   GFRAA >60 >60   GFRNA >60 >60   CA 8.8 8.8   PHOS  --  3.1   ALB 2.7* 3.2*   TP 6.8 6.8   GLOB 4.1* 3.6*   AGRAT 0.7* 0.9*   AST 23 15   ALT 19 24       Recent Labs     01/28/20  1010   URICA 3.2           Recent Labs     06/25/20  0049 01/28/20  1010   * 82       Problem List  Date Reviewed: 6/9/2020          Codes Class Noted    * (Principal) Oligohydramnios ICD-10-CM: O41.00X0  ICD-9-CM: 658.00  6/25/2020        Advanced maternal age, 1st pregnancy ICD-10-CM: O09.519  ICD-9-CM: 659.50  6/25/2020        Encounter for induction of labor ICD-10-CM: Z34.90  ICD-9-CM: V22.1  6/25/2020        Low lying placenta nos or without hemorrhage, second trimester ICD-10-CM: O44.42  ICD-9-CM: 641.03  2/11/2020    Overview Signed 2/11/2020  2:41 PM by Cinda Reeves MD     2/11/2020 [facility]: ant low lying, 1.2cm from os. Should resolve. · Reassess at 28wk in [facility] office. Anemia affecting pregnancy ICD-10-CM: O99.019  ICD-9-CM: 648.20, 285.9  1/28/2020    Overview Signed 2/11/2020  2:44 PM by Manny May RN     2/11/2020 at [facility]:  Hgb              High-risk pregnancy in second trimester ICD-10-CM: O09.92  ICD-9-CM: V23.9  12/30/2019    Overview Addendum 2/6/2020 10:43 AM by Linda Box, KASI     See AMA Overview             Uterus bicornis affecting pregnancy in third trimester ICD-10-CM: O34.02, Q51.3  ICD-9-CM: 654.03, 752.34  12/30/2019    Overview Addendum 2/6/2020 10:43 AM by Linda Box RN     12/30/2019 at [facility]-Bicornuate Uterus noted today, at increased risk of IUGR and Cervical insufficiency after 20 weeks. See AMA Overview             Multigravida of advanced maternal age in second trimester ICD-10-CM: O09.522  ICD-9-CM: 659.63  11/23/2019    Overview Addendum 2/11/2020  2:45 PM by Manny May RN     12/30/2019 at [facility]: +FCA, Normal NT 1.3 mm, Nasal bone seen, JUAN PABLO WNL. Genetic counseling done by genetics counselor and MD. Wanted NIPT, Unable to draw NIPT x1, patient probably partially dehydrated. Severe Needle phobia. Will have drawn at next OB visit in 1 week. Bicornuate Uterus seen today, will follow with Cervical length at next US. · Low dose Aspirin 162 mg daily is recommended. 2/11/2020 [facility]: NIPT low risk 1/6. Normal anatomy/echo.  AC 77%, JUAN PABLO WNL, CL 3.7cm  · No F/u MFM  · Primary OB U/S 28 weeks growth                       Current Facility-Administered Medications   Medication Dose Route Frequency    diph,Pertuss(AC),Tet Vac-PF (BOOSTRIX) suspension 0.5 mL  0.5 mL IntraMUSCular PRIOR TO DISCHARGE    ibuprofen (MOTRIN) tablet 800 mg  800 mg Oral Q6H PRN    oxyCODONE IR (ROXICODONE) tablet 5 mg  5 mg Oral Q4H PRN    naloxone (NARCAN) injection 0.4 mg  0.4 mg IntraVENous PRN    ondansetron (ZOFRAN ODT) tablet 4 mg  4 mg Oral ONCE PRN    simethicone (MYLICON) tablet 80 mg  80 mg Oral QID PRN    docusate sodium (COLACE) capsule 100 mg  100 mg Oral BID PRN    diphenhydrAMINE (BENADRYL) capsule 25 mg  25 mg Oral Q4H PRN    loperamide (IMODIUM) capsule 2 mg  2 mg Oral Q4H PRN    zolpidem (AMBIEN) tablet 5 mg  5 mg Oral QHS PRN    oxyCODONE IR (ROXICODONE) tablet 10 mg  10 mg Oral Q4H PRN    witch hazel-glycerin (TUCKS) 12.5-50 % pads 1 Pad  1 Pad PeriANAL PRN    benzocaine-menthoL (DERMOPLAST) 20-0.5 % topical aerosol 1 Spray  1 Spray Topical PRN    hydrocortisone-pramoxine (ANALPRAM-HC) 2.5-1 % (4g) cream   Topical Q4H PRN    acetaminophen (TYLENOL) tablet 1,000 mg  1,000 mg Oral Q6H PRN       OB History    Para Term  AB Living   1 1 1 0 0 1   SAB TAB Ectopic Molar Multiple Live Births   0 0 0 0 0 1      # Outcome Date GA Lbr Kolby/2nd Weight Sex Delivery Anes PTL Lv   1 Term 20 38w4d 12:35 / 01:02 6 lb 8.1 oz (2.95 kg) M Vag-Spont EPI N FARIBA      Name: Marita Simeonaser: 9  Apgar5: 9       Assessment and Plan:      PPD 1:  Patient appears to be having uncomplicated post-partum course. Continue routine perineal care and maternal education. Plan discharge tomorrow if no problems occur.   reastfeeder. Breastfeeding strategies reviewed. SIDs  precautions reviewed    ABO Group   Date Value Ref Range Status   2019 B  Final     Rh (D)   Date Value Ref Range Status   2019 Positive  Final     Comment:     Please note: Prior records for this patient's ABO / Rh type are not  available for additional verification.        Rubella Ab, IgG   Date Value Ref Range Status   11/18/2019 22.50 Immune >0.99 index Final     Comment:                                     Non-immune       <0.90                                  Equivocal  0.90 - 0.99                                  Immune           >0.99

## 2020-06-27 VITALS
DIASTOLIC BLOOD PRESSURE: 68 MMHG | HEART RATE: 67 BPM | RESPIRATION RATE: 18 BRPM | TEMPERATURE: 97.6 F | SYSTOLIC BLOOD PRESSURE: 104 MMHG | OXYGEN SATURATION: 100 %

## 2020-06-27 PROCEDURE — 74011250637 HC RX REV CODE- 250/637: Performed by: OBSTETRICS & GYNECOLOGY

## 2020-06-27 PROCEDURE — 90715 TDAP VACCINE 7 YRS/> IM: CPT | Performed by: OBSTETRICS & GYNECOLOGY

## 2020-06-27 PROCEDURE — 74011250636 HC RX REV CODE- 250/636: Performed by: OBSTETRICS & GYNECOLOGY

## 2020-06-27 RX ORDER — IBUPROFEN 800 MG/1
800 TABLET ORAL
Qty: 100 TAB | Refills: 2 | Status: SHIPPED | OUTPATIENT
Start: 2020-06-27

## 2020-06-27 RX ADMIN — IBUPROFEN 800 MG: 800 TABLET ORAL at 08:02

## 2020-06-27 RX ADMIN — DOCUSATE SODIUM 100 MG: 100 CAPSULE, LIQUID FILLED ORAL at 08:02

## 2020-06-27 RX ADMIN — TETANUS TOXOID, REDUCED DIPHTHERIA TOXOID AND ACELLULAR PERTUSSIS VACCINE, ADSORBED 0.5 ML: 5; 2.5; 8; 8; 2.5 SUSPENSION INTRAMUSCULAR at 12:39

## 2020-06-27 NOTE — PROGRESS NOTES
Progress Note                               Patient: Kenji Ham MRN: [id number]  SSN: [id number]    YOB: 1980  Age: 47 y.o. Sex: female      Postpartum Day Number 2    Subjective:     Patient doing well postpartum without significant complaints. Voiding without difficulty. Patient reports normal lochia. . Breastfeeding: Yes     Objective:     Patient Vitals for the past 18 hrs:   Temp Pulse Resp BP SpO2   20 0809 97.6 °F (36.4 °C) 67 18 104/68 100 %   20 2214 98 °F (36.7 °C) 82 18 103/56 100 %        Temp (24hrs), Av.8 °F (36.6 °C), Min:97.6 °F (36.4 °C), Max:98 °F (36.7 °C)      Physical Exam:    General:   Patient without distress. Abdomen: Soft, fundus firm at level of umbilicus, nontender   Lower Extremities: Negative for swelling, cords, or tenderness. Lab/Data Review:  CBC:    Recent Labs     20  0049   WBC 8.3   HGB 12.4   HCT 37.6        Blood Type:   Lab Results   Component Value Date/Time    ABO/Rh(D) B POSITIVE 2020 12:49 AM      Infant's Blood Type: Information for the patient's :  Haroldo Victor [[id number]]     Lab Results   Component Value Date/Time    ABO/Rh(D) B POSITIVE 2020 03:22 PM       Assessment and Plan: Will discharge home today.   Brenden Payan MD  1:15 PM

## 2020-06-27 NOTE — LACTATION NOTE
This note was copied from a baby's chart. Individualized Feeding Plan for Breastfeeding   Lactation Services [phone]      As much as possible, hold your baby on your chest so babys bare skin is against your bare skin with a blanket covering babys back, especially 30 minutes before it is time for baby to eat. Watch for early feeding cues such as, licking lips, sucking motions, rooting, hands to mouth. Crying is a late feeding cue. Feed your baby at least 8 times in 24 hours, or more if your baby is showing feeding cues. If baby is sleepy put baby skin to skin and watch for hunger cues. To rouse baby: unwrap, undress, massage hands, feet, & back, change diaper, gently change babys position from lying to sitting. 15-20 minutes on the first breast of active breastfeeding is considered a good feeding. Good, active breastfeeding is when baby is alert, tugging the nipple, their ear may move, and you can hear swallows. Allow baby to finish the first side before changing sides. Sleeping at the breast or only brief, light sucks should not be considered a good, full breastfeed. At each feeding:  __x__1. Do Suck Practice on finger before each feeding until sucking pattern is smooth. Try using index finger. Nail down towards tongue. __x__2. Hand Express for a few minutes prior to latching to help start milk flow. __x__3. Baby needs to NURSE WELL x 15-20 minutes on at least first breast, burp and offer 2nd breast at every feeding. If no sustained latch only attempt at breast for 10 minutes. If baby does not latch on and feed well on at least one side, you should:   __x__4. Double pump for 15 minutes with breast massage and compression. Hand express for an additional 2-3 minutes per side. Pump after each feeding attempt or poor feeding, up to 8 times per day. If you are not putting baby to the breast you need to pump 8 times a day. Pump every 3 hours. __x__5.  Give baby all of the breast milk you obtain using a straight syringe or  curved syringe. If baby does NOT have enough wet and dirty diapers per day, is jaundiced/lethargic, or has significant weight loss AND you do NOT pump enough milk for each feeding (per volume listed below), formula supplementation may need to be used. Call lactation department /pediatrician if you have concerns. AVERAGE INTAKES OF COLOSTRUM BY HEALTHY  INFANTS:  Time  Day Intake (ml per feeding)  Based on 8 feedings per day. 1st 24 hrs  1 2-10 ml  24-48 hrs  2 5-15 ml  48-72 hrs  3 15-30 ml (0.5-1 oz)  72-96 hrs  4 30-45 ml (1-1.5oz)                          5-6      45-60 ml (1.5-2oz)                           7         60-75 ml (2-2.5oz)    By day 7, baby will need 65 ml or 2.1 oz at each feeding based on 8 feedings per day & babys weight. (1oz = 30ml). Total milk volume needed in 24 hours by Day 7 is 17.5 oz per day based on baby's birthweight of 6lb 8oz. The more often baby eats, the less volume they need per feeding. If baby is eating more often than the minimum of 8 times per day, they may take less per feeding. Comments: If pumping, suggest using olive oil or coconut oil on your nipples before pumping to help reduce the friction. Use feeding plan until follow up with pediatrician. Continue to attempt at the breast for most feeds. Pump every 3 hours if no latch. Give all pumped colostrum/breastmilk at each feeding. OUTPATIENT APPOINTMENT Suggested. Outpatient services are located on the 4th floor at [facility]. Check in at the 4th floor registration desk (the same one you used when you came to have your baby).   Call for questions [phone]

## 2020-06-27 NOTE — DISCHARGE INSTRUCTIONS
Patient Education        Vaginal Childbirth: Care Instructions  Your Care Instructions     Your body will slowly heal in the next few weeks. It is easy to get too tired and overwhelmed during the first weeks after your baby is born. Changes in your hormones can shift your mood without warning. You may find it hard to meet the extra demands on your energy and time. Take it easy on yourself. Follow-up care is a key part of your treatment and safety. Be sure to make and go to all appointments, and call your doctor if you are having problems. It's also a good idea to know your test results and keep a list of the medicines you take. How can you care for yourself at home? · Vaginal bleeding and cramps  ? After delivery, you will have a bloody discharge from the vagina. This will turn pink within a week and then white or yellow after about 10 days. It may last for 2 to 4 weeks or longer, until the uterus has healed. Use pads instead of tampons until you stop bleeding. ? Do not worry if you pass some blood clots, as long as they are smaller than a golf ball. If you have a tear or stitches in your vaginal area, change the pad at least every 4 hours to prevent soreness and infection. ? You may have cramps for the first few days after childbirth. These are normal and occur as the uterus shrinks to normal size. Take an over-the-counter pain medicine, such as acetaminophen (Tylenol), ibuprofen (Advil, Motrin), or naproxen (Aleve), for cramps. Read and follow all instructions on the label. Do not take aspirin, because it can cause more bleeding. ? Do not take two or more pain medicines at the same time unless the doctor told you to. Many pain medicines have acetaminophen, which is Tylenol. Too much acetaminophen (Tylenol) can be harmful. · Stitches  ? If you have stitches, they will dissolve on their own and do not need to be removed. Follow your doctor's instructions for cleaning the stitched area.   ? Put ice or a cold pack on your painful area for 10 to 20 minutes at a time, several times a day, for the first few days. Put a thin cloth between the ice and your skin. ? Sit in a few inches of warm water (sitz bath) 3 times a day and after bowel movements. The warm water helps with pain and itching. If you do not have a tub, a warm shower might help. · Breast fullness  ? Your breasts may overfill (engorge) in the first few days after delivery. To help milk flow and to relieve pain, warm your breasts in the shower or by using warm, moist towels before nursing. ? If you are not nursing, do not put warmth on your breasts or touch your breasts. Wear a tight bra or sports bra and use ice until the fullness goes away. This usually takes 2 to 3 days. ? Put ice or a cold pack on your breast after nursing to reduce swelling and pain. Put a thin cloth between the ice and your skin. · Activity  ? Eat a balanced diet. Do not try to lose weight by cutting calories. Keep taking your prenatal vitamins, or take a multivitamin. ? Get as much rest as you can. Try to take naps when your baby sleeps during the day. ? Get some exercise every day. But do not do any heavy exercise until your doctor says it is okay. ? Wait until you are healed (about 4 to 6 weeks) before you have sexual intercourse. Your doctor will tell you when it is okay to have sex. ? Talk to your doctor about birth control. You can get pregnant even before your period returns. Also, you can get pregnant while you are breastfeeding. · Mental health  ? It is normal to have some sadness, anxiety, sleeplessness, and mood swings after you go home. If you feel upset or hopeless for more than a few days or are having trouble doing the things you need to do, talk to your doctor. · Constipation and hemorrhoids  ? Drink plenty of fluids, enough so that your urine is light yellow or clear like water.  If you have kidney, heart, or liver disease and have to limit fluids, talk with your doctor before you increase the amount of fluids you drink. ? Eat plenty of fiber each day. Have a bran muffin or bran cereal for breakfast, and try eating a piece of fruit for a mid-afternoon snack. ? For painful, itchy hemorrhoids, put ice or a cold pack on the area several times a day for 10 minutes at a time. Follow this by putting a warm compress on the area for another 10 to 20 minutes or by sitting in a shallow, warm bath. When should you call for help? Call  911 anytime you think you may need emergency care. For example, call if:  · You have thoughts of harming yourself, your baby, or another person. · You passed out (lost consciousness). · You have chest pain, are short of breath, or cough up blood. · You have a seizure. Call your doctor now or seek immediate medical care if:  · You have severe vaginal bleeding. · You are dizzy or lightheaded, or you feel like you may faint. · You have a fever. · You have new or more pain in your belly or pelvis. · You have symptoms of a blood clot in your leg (called a deep vein thrombosis), such as:  ? Pain in the calf, back of the knee, thigh, or groin. ? Redness and swelling in your leg or groin. · You have signs of preeclampsia, such as:  ? Sudden swelling of your face, hands, or feet. ? New vision problems (such as dimness, blurring, or seeing spots). ? A severe headache. Watch closely for changes in your health, and be sure to contact your doctor if:  · Your vaginal bleeding seems to be getting heavier. · You have new or worse vaginal discharge. · You feel sad, anxious, or hopeless for more than a few days. · You do not get better as expected. Where can you learn more? Go to http://www.gray.com/  Enter Q237 in the search box to learn more about \"Vaginal Childbirth: Care Instructions. \"  Current as of: February 11, 2020               Content Version: 12.5  © 2932-8270 [employer].    Care instructions adapted under license by [employer] (which disclaims liability or warranty for this information). If you have questions about a medical condition or this instruction, always ask your healthcare professional. [address] any warranty or liability for your use of this information.

## 2020-06-27 NOTE — DISCHARGE SUMMARY
Obstetrical Discharge Summary     Name: Marissa Vera MRN: [id number]  SSN: [id number]    YOB: 1980  Age: 47 y.o. Sex: female      Allergies: Patient has no known allergies. Admit Date: 2020    Discharge Date: 2020     Admitting Physician: Amrit Gage MD     Attending Physician:  Karin Gee MD     * Admission Diagnoses: Oligohydramnios Dorann Males; Advanced maternal age, 1st pregnancy [O09.519]; Encounter for induction of labor [Z34.90]    * Discharge Diagnoses:   Information for the patient's :  Ct Roy [[id number]]   Delivery of a 6 lb 8.1 oz (2.95 kg) male infant via Vaginal, Spontaneous on 2020 at 3:22 PM  by Faiza Floss. Apgars were 9  and 9 . Additional Diagnoses:   Hospital Problems as of 2020 Date Reviewed: 2020          Codes Class Noted - Resolved POA    * (Principal) Oligohydramnios ICD-10-CM: O41.00X0  ICD-9-CM: 658.00  2020 - Present Unknown        Advanced maternal age, 1st pregnancy ICD-10-CM: O09.519  ICD-9-CM: 659.50  2020 - Present Unknown        Encounter for induction of labor ICD-10-CM: Z34.90  ICD-9-CM: V22.1  2020 - Present Unknown        Uterus bicornis affecting pregnancy in third trimester ICD-10-CM: O34.02, Q51.3  ICD-9-CM: 654.03, 752.34  2019 - Present Yes    Overview Addendum 2020 10:43 AM by Jordi Russell RN     2019 at [facility]Bicornuate Uterus noted today, at increased risk of IUGR and Cervical insufficiency after 20 weeks. See AMA Overview                  Lab Results   Component Value Date/Time    ABO/Rh(D) B POSITIVE 2020 12:49 AM      Immunization History   Administered Date(s) Administered    Influenza Vaccine (Quad) PF 2019    Tdap 2020       * Procedures:  with 1st degree and periurethral tears with repair       * Discharge Condition: good    * Hospital Course: Normal hospital course following the delivery.     * Disposition: Home    Discharge Medications:   Current Discharge Medication List      START taking these medications    Details   ibuprofen (MOTRIN) 800 mg tablet Take 1 Tab by mouth every six (6) hours as needed for Pain. Qty: 100 Tab, Refills: 2         CONTINUE these medications which have NOT CHANGED    Details   calcium carbonate (TUMS PO) Take  by mouth.      [id number] erasto-folic-dha (PRENATAL DHA+COMPLETE PRENATAL) -300 mg-mcg-mg cmpk Take  by mouth. AMBULATORY BREAST PUMP Use as directed  Qty: 1 Pump, Refills: 0      acetaminophen (TYLENOL) 325 mg tablet Take  by mouth every four (4) hours as needed for Pain.      mag/aluminum/sod bicarb/alginc (GAVISCON PO) Take  by mouth. * Follow-up Care/Patient Instructions:   Activity: No sex for 6 weeks  Diet: Regular Diet  Wound Care: As directed    Follow-up Information     Follow up With Specialties Details Why Contact Info    Wendie Tineo MD Obstetrics & Gynecology, Gynecology, Obstetrics Call follow up in 6 weeks [place] [phone]      None    None ([phone]) Patient stated that they have no PCP           Reta Amaro MD  1:17 PM

## 2020-06-27 NOTE — PROGRESS NOTES
Motrin 800 mg given po for complaints of perineal discomfort and cramping.     06/27/20 0802   Pain Assessment   Pain Scale 1 Numeric (0 - 10)   Pain Intensity 1 2   Pain Location 1 Perineum; Abdomen   Pain Orientation 1 Lower   Pain Description 1 Cramping; Sore

## 2020-06-27 NOTE — LACTATION NOTE

## 2020-06-27 NOTE — LACTATION NOTE
This note was copied from a baby's chart. RN stated mom would like hospital grade pump rental to have at home in case needed this first week, before personal pump comes. When came to room, mom states baby has not fed well since early this am. Offered to pump w/ mom at this time and feed back any expressed colostrum and mom was agreeable. Full instruction given on how to use hospital grade pump and pump part. She pumped x 15 minutes and got 0.9 ml of colostrum. Showed parents how to feed back to baby using straight syringe. Reviewed printed feeding plan w/ parents and dad verbalized understanding how to use. Offered to help mom latch baby at this point to see if wanted to feed more, or to give formula to fill in range needed for day 2, according to feeding plan. Mom requested formula. Pulled up formula and showed parents how to give back using finger feeding method and curve tip syringe. Lactation first demonstrated for several mls of formula and dad finished. Baby got about another 5-6 mls of formula. Baby seemed content after that. Mom burped infant. Dad saved rest of formula in syringe to use at bedside w/in the hour if needed. Reviewed how long pumped breast milk is good for on counter, in frig, and freezer. Mom feels better at this time about plan. Will try to put back to breast at next breast feeding session. Rental paperwork at  for parents to do. Reviewed signs w/ mom again to watch for that baby getting enough food including baby's output of wet/dirties, is content after feeds?, etc. Encouraged mom if breast feeds baby and still feels baby is hungry after long breast feeding session, she can give back extra formula as needed, but encouraged to pump as well in addition so breast milk level will rise higher as well. Mom verbalized understanding. No further needs or questions at this time.

## 2020-06-27 NOTE — LACTATION NOTE
This note was copied from a baby's chart. In to see mom and infant for discharge. Infant just came back from circumcision. Suckling on mom breasts non- nutritively. Helped mom get a deeper latch, but baby not interested in feeding at this time. Reviewed post circumcision feeding expectations. Discussed discharge info and how to manage period of engorgement. Discussed importance of 8-12 full feeds per day, monitor output. Highly encouraged to try to get upper range of feeds per day as baby's bilirubin level is high intermediate. Mom verbalized understanding. Personal pump ordered through insurance and to come in the mail. No further needs or questions at this time.

## 2020-06-27 NOTE — PROGRESS NOTES
Pt discharged home with infant after ID bands verified and code alert removed. Discharge teaching complete. Pt verbalized understanding; questions encouraged. Pt transported via wheelchair to private vehicle per pt request. Infant transferred in rear facing car seat carried by dad. Pt stable at discharge.

## 2021-01-01 NOTE — PROGRESS NOTES
06/25/20 1255 06/25/20 1258 06/25/20 1302   Maternal Vital Signs   Pulse (Heart Rate) 94 96 91   BP 95/52 97/55 125/72   MAP (Monitor) 68 73 92   MAP (Calculated) 66 69 90   BP responded to MISHEL per CRNA. I will SWITCH the dose or number of times a day I take the medications listed below when I get home from the hospital:  None

## 2023-04-07 NOTE — L&D DELIVERY NOTE
Delivery Note    Obstetrician:  Aspen Garrett MD    Pre-Delivery Diagnosis: 47yo G1 at 38w3d with IOL due to oligohydramnios, pregnancy complicated by AMA, bicornuate uterus, GBS neg    Post-Delivery Diagnosis: Living  infant(s) and Male    Intrapartum Event: None    Procedure: Spontaneous vaginal delivery    Epidural: YES    Monitor:  Fetal Heart Tones - External and Uterine Contractions - External    Indications for instrumental delivery: none    Estimated Blood Loss: QBL pending    Episiotomy: none    Laceration(s):  2nd degree and left periurethral    Laceration(s) repair: YES    Presentation: Cephalic    Fetal Description: dorsey    Fetal Position: Left Occiput Anterior    BABY INFORMATION  NAME:   Information for the patient's :  Mindy Fernandes [[id number]]   [address]: female  DATE AND TIME OF BIRTH:   Information for the patient's :  Mindy Fernandes [[id number]]   2020   at   Information for the patient's :  Mindy Fernandes [[id number]]   [address]:   Information for the patient's :  Mindy Fernandes [[id number]]       and   Information for the patient's :  Mindy Fernandes [[id number]]      . APGARS:  Information for the patient's :  Mindy Fernandes [[id number]]         Information for the patient's :  Mindy Fernandes [[id number]]          Umbilical Cord: 3 vessels present and Cord blood sent to lab for type, Rh, and Tasha' test    Specimens: cord blood was drawn and sent. The placenta was disposed. Complications:  none           Lab Results   Component Value Date/Time    ABO/Rh(D) B POSITIVE 2020 12:49 AM    Antibody screen NEG 2020 12:49 AM            Attending Attestation: I performed the procedure. No dystocia was encountered. Mild uterine atony was encountered and the uterus was cleared of clots and the bladder was drained. The uterus was firm with bimanual massage and IV pitocin. 0.2 Methergine given IM x 1 for hemorrhage prophylaxis. 1st degree perineal repair and left periurethral lac were repaired in the usual fashion under epidural anesthesia. Delivery Summary    Patient: Austen Sands MRN: [id number]  SSN: [id number]    YOB: 1980  Age: 47 y.o. Sex: female       Information for the patient's :  Flora Yepez [[id number]]       Labor Events:    Labor: No    Steroids: None   Cervical Ripening Date/Time: 2020 1:25 AM   Cervical Ripening Type: Misoprostol   Antibiotics During Labor: No   Rupture Identifier:      Rupture Date/Time: 2020 10:56 AM   Rupture Type: AROM   Amniotic Fluid Volume:      Amniotic Fluid Description: Clear;Blood stained    Amniotic Fluid Odor:      Induction: Oxytocin;Prostaglandins       Induction Date/Time: 2020 1:45 AM    Indications for Induction: Other(comment)    Augmentation: None   Augmentation Date/Time:      Indications for Augmentation:     Labor complications:          Additional complications:        Delivery Events:  Indications For Episiotomy:     Episiotomy: None   Perineal Laceration(s): 1st   Repaired: Yes   Periurethral Laceration Location: left    Repaired: Yes   Labial Laceration Location:     Repaired:     Sulcal Laceration Location:     Repaired:     Vaginal Laceration Location:     Repaired:     Cervical Laceration Location:     Repaired:     Repair Suture: Monocryl 2-0   Number of Repair Packets: 1   Estimated Blood Loss (ml):  ml   Quantitative Blood Loss (ml)                Delivery Date: 2020    Delivery Time: 3:22 PM  Delivery Type: Vaginal, Spontaneous  Sex:  Male    Gestational Age: 38w3d   Delivery Clinician:  Orelia Blizzard  Living Status: Living   Delivery Location: L&D            APGARS  One minute Five minutes Ten minutes   Skin color: 1   1        Heart rate: 2   2        Grimace: 2   2        Muscle tone: 2   2        Breathin   2        Totals: 9   9 Presentation: Vertex    Position: Left Occiput Anterior  Resuscitation Method:  Tactile Stimulation;Suctioning-bulb     Meconium Stained: None      Cord Information: 3 Vessels  Complications: Nuchal Cord With Compressions  Cord around: head  Delayed cord clamping? Yes  Cord clamped date/time:   Disposition of Cord Blood: Lab    Blood Gases Sent?: No    Placenta:  Date/Time: 2020  3:27 PM  Removal: Spontaneous      Appearance: Normal;Intact      Measurements:  Birth Weight: 6 lb 8.1 oz (2.95 kg)      Birth Length: 51 cm      Head Circumference: 35 cm      Chest Circumference: 32 cm     Abdominal Girth: Other Providers:   Valery MILLER;YANDY MILLER;MATTHEW EVANS;GALEN ROSA;Rishi FREED, Obstetrician;Primary Nurse;Primary Ramer Nurse;Neonatologist;Respiratory Therapist           Group B Strep: No results found for: Dina Perla  Information for the patient's :  Ulysses Jones [[id number]]   No results found for: ABORH, PCTABR, PCTDIG, BILI, ABORHEXT, ABORH    No results for input(s): PCO2CB, PO2CB, HCO3I, SO2I, IBD, PTEMPI, SPECTI, PHICB, ISITE, IDEV, IALLEN in the last 72 hours. No